# Patient Record
Sex: FEMALE | Race: WHITE | URBAN - METROPOLITAN AREA
[De-identification: names, ages, dates, MRNs, and addresses within clinical notes are randomized per-mention and may not be internally consistent; named-entity substitution may affect disease eponyms.]

---

## 2021-12-07 ENCOUNTER — *OTHER (OUTPATIENT)
Dept: URBAN - METROPOLITAN AREA CLINIC 26 | Facility: CLINIC | Age: 46
Setting detail: DERMATOLOGY
End: 2021-12-07

## 2021-12-07 DIAGNOSIS — D23.39 OTHER BENIGN NEOPLASM OF SKIN OF OTHER PARTS OF FACE: ICD-10-CM

## 2021-12-07 DIAGNOSIS — D18.01 HEMANGIOMA OF SKIN AND SUBCUTANEOUS TISSUE: ICD-10-CM

## 2021-12-07 DIAGNOSIS — L90.5 SCAR CONDITIONS AND FIBROSIS OF SKIN: ICD-10-CM

## 2021-12-07 DIAGNOSIS — Z85.828 PERSONAL HISTORY OF OTHER MALIGNANT NEOPLASM OF SKIN: ICD-10-CM

## 2021-12-07 DIAGNOSIS — D23.5 OTHER BENIGN NEOPLASM OF SKIN OF TRUNK: ICD-10-CM

## 2021-12-07 PROCEDURE — 99203 OFFICE O/P NEW LOW 30 MIN: CPT

## 2022-12-06 ENCOUNTER — APPOINTMENT (OUTPATIENT)
Dept: URBAN - METROPOLITAN AREA CLINIC 198 | Age: 47
Setting detail: DERMATOLOGY
End: 2022-12-06

## 2022-12-06 DIAGNOSIS — L72.0 EPIDERMAL CYST: ICD-10-CM

## 2022-12-06 DIAGNOSIS — L82.1 OTHER SEBORRHEIC KERATOSIS: ICD-10-CM

## 2022-12-06 DIAGNOSIS — L81.4 OTHER MELANIN HYPERPIGMENTATION: ICD-10-CM

## 2022-12-06 DIAGNOSIS — Z11.52 ENCOUNTER FOR SCREENING FOR COVID-19: ICD-10-CM

## 2022-12-06 DIAGNOSIS — L81.1 CHLOASMA: ICD-10-CM

## 2022-12-06 DIAGNOSIS — D22 MELANOCYTIC NEVI: ICD-10-CM

## 2022-12-06 PROBLEM — D22.62 MELANOCYTIC NEVI OF LEFT UPPER LIMB, INCLUDING SHOULDER: Status: ACTIVE | Noted: 2022-12-06

## 2022-12-06 PROBLEM — D22.61 MELANOCYTIC NEVI OF RIGHT UPPER LIMB, INCLUDING SHOULDER: Status: ACTIVE | Noted: 2022-12-06

## 2022-12-06 PROBLEM — D22.72 MELANOCYTIC NEVI OF LEFT LOWER LIMB, INCLUDING HIP: Status: ACTIVE | Noted: 2022-12-06

## 2022-12-06 PROBLEM — D23.39 OTHER BENIGN NEOPLASM OF SKIN OF OTHER PARTS OF FACE: Status: ACTIVE | Noted: 2022-12-06

## 2022-12-06 PROBLEM — D22.5 MELANOCYTIC NEVI OF TRUNK: Status: ACTIVE | Noted: 2022-12-06

## 2022-12-06 PROBLEM — D22.71 MELANOCYTIC NEVI OF RIGHT LOWER LIMB, INCLUDING HIP: Status: ACTIVE | Noted: 2022-12-06

## 2022-12-06 PROCEDURE — OTHER PRESCRIPTION: OTHER

## 2022-12-06 PROCEDURE — OTHER COUNSELING: OTHER

## 2022-12-06 PROCEDURE — 99213 OFFICE O/P EST LOW 20 MIN: CPT

## 2022-12-06 PROCEDURE — OTHER PRESCRIPTION MEDICATION MANAGEMENT: OTHER

## 2022-12-06 PROCEDURE — OTHER MIPS QUALITY: OTHER

## 2022-12-06 PROCEDURE — OTHER SCREENING FOR COVID-19: OTHER

## 2022-12-06 RX ORDER — FLUOCINOLONE ACETONIDE, HYDROQUINONE, AND TRETINOIN .1; 40; .5 MG/G; MG/G; MG/G
CREAM TOPICAL QHS
Qty: 30 | Refills: 3 | Status: ERX | COMMUNITY
Start: 2022-12-06

## 2022-12-06 ASSESSMENT — LOCATION ZONE DERM
LOCATION ZONE: EAR
LOCATION ZONE: FACE
LOCATION ZONE: LEG
LOCATION ZONE: TRUNK
LOCATION ZONE: ARM

## 2022-12-06 ASSESSMENT — LOCATION DETAILED DESCRIPTION DERM
LOCATION DETAILED: LEFT DISTAL POSTERIOR THIGH
LOCATION DETAILED: EPIGASTRIC SKIN
LOCATION DETAILED: INFERIOR MID FOREHEAD
LOCATION DETAILED: RIGHT ELBOW
LOCATION DETAILED: LEFT PROXIMAL DORSAL FOREARM
LOCATION DETAILED: LEFT CAVUM CONCHA
LOCATION DETAILED: INFERIOR THORACIC SPINE
LOCATION DETAILED: LEFT CENTRAL MALAR CHEEK
LOCATION DETAILED: RIGHT DISTAL POSTERIOR THIGH
LOCATION DETAILED: LEFT EXTERNAL AUDITORY CANAL
LOCATION DETAILED: RIGHT RIB CAGE

## 2022-12-06 ASSESSMENT — LOCATION SIMPLE DESCRIPTION DERM
LOCATION SIMPLE: INFERIOR FOREHEAD
LOCATION SIMPLE: LEFT POSTERIOR THIGH
LOCATION SIMPLE: LEFT EAR
LOCATION SIMPLE: RIGHT ELBOW
LOCATION SIMPLE: LEFT CHEEK
LOCATION SIMPLE: RIGHT POSTERIOR THIGH
LOCATION SIMPLE: ABDOMEN
LOCATION SIMPLE: UPPER BACK
LOCATION SIMPLE: LEFT FOREARM

## 2022-12-06 NOTE — PROCEDURE: PRESCRIPTION MEDICATION MANAGEMENT
Initiate Treatment: Tri-Sophy QHS x3 months
Detail Level: Detailed
Render In Strict Bullet Format?: No

## 2023-11-10 ENCOUNTER — OFFICE VISIT (OUTPATIENT)
Dept: NEUROLOGY | Facility: CLINIC | Age: 48
End: 2023-11-10
Payer: COMMERCIAL

## 2023-11-10 VITALS
WEIGHT: 138 LBS | OXYGEN SATURATION: 98 % | HEART RATE: 71 BPM | RESPIRATION RATE: 18 BRPM | HEIGHT: 63 IN | BODY MASS INDEX: 24.45 KG/M2 | DIASTOLIC BLOOD PRESSURE: 82 MMHG | SYSTOLIC BLOOD PRESSURE: 118 MMHG

## 2023-11-10 DIAGNOSIS — G43.009 MIGRAINE WITHOUT AURA AND WITHOUT STATUS MIGRAINOSUS, NOT INTRACTABLE: Primary | ICD-10-CM

## 2023-11-10 PROCEDURE — 3008F BODY MASS INDEX DOCD: CPT | Performed by: STUDENT IN AN ORGANIZED HEALTH CARE EDUCATION/TRAINING PROGRAM

## 2023-11-10 PROCEDURE — 99205 OFFICE O/P NEW HI 60 MIN: CPT | Performed by: STUDENT IN AN ORGANIZED HEALTH CARE EDUCATION/TRAINING PROGRAM

## 2023-11-10 RX ORDER — ALBUTEROL SULFATE 90 UG/1
INHALANT RESPIRATORY (INHALATION)
COMMUNITY

## 2023-11-10 RX ORDER — LEFLUNOMIDE 20 MG/1
1 TABLET ORAL DAILY
COMMUNITY
End: 2024-02-14

## 2023-11-10 RX ORDER — SODIUM, POTASSIUM,MAG SULFATES 17.5-3.13G
SOLUTION, RECONSTITUTED, ORAL ORAL
COMMUNITY
Start: 2023-10-09 | End: 2024-02-14

## 2023-11-10 RX ORDER — KETOCONAZOLE 20 MG/G
CREAM TOPICAL
COMMUNITY
Start: 2023-09-06 | End: 2024-09-12

## 2023-11-10 RX ORDER — HYDROXYCHLOROQUINE SULFATE 200 MG/1
200 TABLET, FILM COATED ORAL 2 TIMES DAILY
COMMUNITY

## 2023-11-10 RX ORDER — ELETRIPTAN HYDROBROMIDE 40 MG/1
TABLET, FILM COATED ORAL
COMMUNITY
Start: 2023-08-10 | End: 2023-11-10

## 2023-11-10 RX ORDER — ELETRIPTAN HYDROBROMIDE 40 MG/1
TABLET, FILM COATED ORAL
COMMUNITY
Start: 2023-06-16 | End: 2023-11-10

## 2023-11-10 RX ORDER — CHOLECALCIFEROL (VITAMIN D3) 25 MCG
1000 TABLET ORAL DAILY
COMMUNITY

## 2023-11-10 RX ORDER — OMEPRAZOLE 40 MG/1
CAPSULE, DELAYED RELEASE ORAL
COMMUNITY
Start: 2023-08-16 | End: 2024-02-14

## 2023-11-10 RX ORDER — ETANERCEPT 25 MG
KIT SUBCUTANEOUS
COMMUNITY
End: 2024-02-14

## 2023-11-10 RX ORDER — ATOGEPANT 60 MG/1
60 TABLET ORAL DAILY
Qty: 90 TABLET | Refills: 3 | Status: SHIPPED | OUTPATIENT
Start: 2023-11-10 | End: 2023-12-18 | Stop reason: SDUPTHER

## 2023-11-10 RX ORDER — PRAMIPEXOLE DIHYDROCHLORIDE 0.25 MG/1
TABLET ORAL
COMMUNITY
Start: 2023-09-15 | End: 2023-11-10

## 2023-11-10 RX ORDER — ASCORBIC ACID 250 MG
250 TABLET ORAL DAILY
COMMUNITY

## 2023-11-10 RX ORDER — GABAPENTIN ENACARBIL 300 MG/1
900 TABLET, EXTENDED RELEASE ORAL NIGHTLY
Qty: 270 TABLET | Refills: 1 | Status: SHIPPED | OUTPATIENT
Start: 2023-11-10 | End: 2024-10-25 | Stop reason: SDUPTHER

## 2023-11-10 RX ORDER — PRAMIPEXOLE DIHYDROCHLORIDE 0.12 MG/1
0.12 TABLET ORAL NIGHTLY
COMMUNITY
End: 2025-03-04 | Stop reason: SDUPTHER

## 2023-11-10 RX ORDER — GABAPENTIN ENACARBIL 600 MG/1
TABLET, EXTENDED RELEASE ORAL DAILY
COMMUNITY
End: 2023-11-10

## 2023-11-10 RX ORDER — GABAPENTIN ENACARBIL 600 MG/1
600 TABLET, EXTENDED RELEASE ORAL DAILY
COMMUNITY
Start: 2023-08-30 | End: 2023-11-10

## 2023-11-10 RX ORDER — ETANERCEPT 50 MG/ML
SOLUTION SUBCUTANEOUS
COMMUNITY
Start: 2023-08-23 | End: 2024-02-14

## 2023-11-10 RX ORDER — LEFLUNOMIDE 20 MG/1
20 TABLET ORAL DAILY
COMMUNITY
Start: 2023-08-24

## 2023-11-10 RX ORDER — GABAPENTIN ENACARBIL 600 MG/1
TABLET, EXTENDED RELEASE ORAL
COMMUNITY
Start: 2023-08-30 | End: 2023-11-10

## 2023-11-10 RX ORDER — GALCANEZUMAB 120 MG/ML
INJECTION, SOLUTION SUBCUTANEOUS
COMMUNITY
Start: 2023-08-10 | End: 2023-11-10

## 2023-11-10 RX ORDER — ONDANSETRON 4 MG/1
TABLET, FILM COATED ORAL
COMMUNITY
Start: 2023-09-06 | End: 2024-02-14
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
## 2023-11-10 NOTE — PATIENT INSTRUCTIONS
For better headache control: regular meals, sleep and exercise, pace activities, meditation to reduce stress  Headache diary to identify triggers/patterns: www.migrainebuddy.com  At on the onset of headache, take Motrin (Ibuprofen) and Ubrelvy as needed. Stop elatriptan   You can take a second dose of Ubrelvy in 2 hours if ongoing headache.  Max 2 doses in 24 hours  Avoid using Motrin more than an average of 2 days per week (10 days per month) to prevent medication overuse headache.  Preventive medication (daily headache medication) try Qulipta 60mg daily - take when your next dose of emgality would be due.  Stop Emgality.  In 3 weeks, if no issue with Qulipta you can increase gabapentin ER (Horizant) 900mg daily  See pain specialist for trigger point injections  Send me a copy of your brain MRI report  Send me the records from your ophthalmologist (eye doctor)  Follow-up visit in 3 months

## 2023-11-13 ENCOUNTER — OFFICE VISIT (OUTPATIENT)
Dept: PAIN MEDICINE | Facility: CLINIC | Age: 48
End: 2023-11-13
Payer: COMMERCIAL

## 2023-11-13 VITALS
SYSTOLIC BLOOD PRESSURE: 116 MMHG | DIASTOLIC BLOOD PRESSURE: 78 MMHG | OXYGEN SATURATION: 99 % | WEIGHT: 138 LBS | BODY MASS INDEX: 24.45 KG/M2 | HEIGHT: 63 IN | HEART RATE: 72 BPM

## 2023-11-13 DIAGNOSIS — M54.81 BILATERAL OCCIPITAL NEURALGIA: ICD-10-CM

## 2023-11-13 DIAGNOSIS — M54.2 CERVICALGIA: Primary | ICD-10-CM

## 2023-11-13 DIAGNOSIS — M79.18 MYOFASCIAL PAIN SYNDROME: ICD-10-CM

## 2023-11-13 PROCEDURE — 20553 NJX 1/MLT TRIGGER POINTS 3/>: CPT | Performed by: STUDENT IN AN ORGANIZED HEALTH CARE EDUCATION/TRAINING PROGRAM

## 2023-11-13 PROCEDURE — 64450 NJX AA&/STRD OTHER PN/BRANCH: CPT | Mod: 50,XU | Performed by: STUDENT IN AN ORGANIZED HEALTH CARE EDUCATION/TRAINING PROGRAM

## 2023-11-13 PROCEDURE — 99204 OFFICE O/P NEW MOD 45 MIN: CPT | Mod: 25 | Performed by: STUDENT IN AN ORGANIZED HEALTH CARE EDUCATION/TRAINING PROGRAM

## 2023-11-13 PROCEDURE — 64405 NJX AA&/STRD GR OCPL NRV: CPT | Mod: 50,XU | Performed by: STUDENT IN AN ORGANIZED HEALTH CARE EDUCATION/TRAINING PROGRAM

## 2023-11-13 PROCEDURE — 3008F BODY MASS INDEX DOCD: CPT | Performed by: STUDENT IN AN ORGANIZED HEALTH CARE EDUCATION/TRAINING PROGRAM

## 2023-11-13 RX ORDER — LIDOCAINE HYDROCHLORIDE 20 MG/ML
10 INJECTION, SOLUTION INFILTRATION; PERINEURAL ONCE
Status: SHIPPED | OUTPATIENT
Start: 2023-11-13 | End: 2023-11-14

## 2023-11-13 RX ORDER — BUPIVACAINE HYDROCHLORIDE 2.5 MG/ML
9 INJECTION, SOLUTION EPIDURAL; INFILTRATION; INTRACAUDAL ONCE
Status: DISCONTINUED | OUTPATIENT
Start: 2023-11-13 | End: 2023-11-13

## 2023-11-13 ASSESSMENT — PAIN SCALES - GENERAL: PAINLEVEL: 2

## 2023-11-13 NOTE — PATIENT INSTRUCTIONS
Cervical Facet Joint/Medial Branch Block and Radiofrequency Ablation (RFA)  XR guided imaging utilized  Two diagnostic blocks reported ~80% pain relief and improved function followed by RFA  Goal  Diagnostic and therapeutic value  6 months of relief (ranges from minimal to 6 months to 1 year)   Can be repeated every 6 months. Typically, it returns gradually and less than original pain.  Diagnostic Injections  Treatment is only local anesthetic (numbing medication), no steroids used   Under local anesthesia or mild-moderate sedation  Short duration relief is considered successful - duration of local anesthesia  If successful two times, we can consider proceeding with RFA  RFA  Deep sedation  Treatment is the thermal ablation (heat)  .
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
## 2023-11-21 ENCOUNTER — DOCUMENTATION (OUTPATIENT)
Dept: NEUROLOGY | Facility: CLINIC | Age: 48
End: 2023-11-21
Payer: COMMERCIAL

## 2023-11-21 NOTE — PROGRESS NOTES
Clinical staff has attempted to complete this multiple times with many kick backs with ins communication issues.     Submitted both PA's today via fax form with attached clinicals (exp review)

## 2023-11-22 NOTE — PROGRESS NOTES
Ubrelvy- approved   Qulipta- approved for qty 30 for 30 days so script will need to be updated for future fills, pharmacist able to change for current fill

## 2023-12-12 ENCOUNTER — APPOINTMENT (OUTPATIENT)
Dept: URBAN - METROPOLITAN AREA CLINIC 198 | Age: 48
Setting detail: DERMATOLOGY
End: 2023-12-12

## 2023-12-12 DIAGNOSIS — L50.1 IDIOPATHIC URTICARIA: ICD-10-CM

## 2023-12-12 DIAGNOSIS — L82.1 OTHER SEBORRHEIC KERATOSIS: ICD-10-CM

## 2023-12-12 DIAGNOSIS — L738 OTHER SPECIFIED DISEASES OF HAIR AND HAIR FOLLICLES: ICD-10-CM

## 2023-12-12 DIAGNOSIS — L663 OTHER SPECIFIED DISEASES OF HAIR AND HAIR FOLLICLES: ICD-10-CM

## 2023-12-12 DIAGNOSIS — L81.4 OTHER MELANIN HYPERPIGMENTATION: ICD-10-CM

## 2023-12-12 DIAGNOSIS — L57.8 OTHER SKIN CHANGES DUE TO CHRONIC EXPOSURE TO NONIONIZING RADIATION: ICD-10-CM

## 2023-12-12 DIAGNOSIS — D22 MELANOCYTIC NEVI: ICD-10-CM

## 2023-12-12 DIAGNOSIS — L50.3 DERMATOGRAPHIC URTICARIA: ICD-10-CM

## 2023-12-12 PROBLEM — D22.5 MELANOCYTIC NEVI OF TRUNK: Status: ACTIVE | Noted: 2023-12-12

## 2023-12-12 PROBLEM — D22.61 MELANOCYTIC NEVI OF RIGHT UPPER LIMB, INCLUDING SHOULDER: Status: ACTIVE | Noted: 2023-12-12

## 2023-12-12 PROBLEM — D22.72 MELANOCYTIC NEVI OF LEFT LOWER LIMB, INCLUDING HIP: Status: ACTIVE | Noted: 2023-12-12

## 2023-12-12 PROBLEM — D22.62 MELANOCYTIC NEVI OF LEFT UPPER LIMB, INCLUDING SHOULDER: Status: ACTIVE | Noted: 2023-12-12

## 2023-12-12 PROBLEM — D22.71 MELANOCYTIC NEVI OF RIGHT LOWER LIMB, INCLUDING HIP: Status: ACTIVE | Noted: 2023-12-12

## 2023-12-12 PROBLEM — L02.32 FURUNCLE OF BUTTOCK: Status: ACTIVE | Noted: 2023-12-12

## 2023-12-12 PROCEDURE — OTHER TREATMENT REGIMEN: OTHER

## 2023-12-12 PROCEDURE — OTHER COUNSELING: OTHER

## 2023-12-12 PROCEDURE — OTHER SUNSCREEN RECOMMENDATIONS: OTHER

## 2023-12-12 PROCEDURE — OTHER PRESCRIPTION MEDICATION MANAGEMENT: OTHER

## 2023-12-12 PROCEDURE — 99213 OFFICE O/P EST LOW 20 MIN: CPT

## 2023-12-12 ASSESSMENT — LOCATION DETAILED DESCRIPTION DERM
LOCATION DETAILED: RIGHT RIB CAGE
LOCATION DETAILED: INFERIOR THORACIC SPINE
LOCATION DETAILED: LEFT DISTAL POSTERIOR THIGH
LOCATION DETAILED: LEFT CENTRAL MALAR CHEEK
LOCATION DETAILED: RIGHT BUTTOCK
LOCATION DETAILED: LEFT PROXIMAL DORSAL FOREARM
LOCATION DETAILED: RIGHT DISTAL POSTERIOR THIGH
LOCATION DETAILED: LEFT VENTRAL PROXIMAL FOREARM
LOCATION DETAILED: RIGHT ELBOW
LOCATION DETAILED: LEFT BUTTOCK
LOCATION DETAILED: LEFT PROXIMAL PRETIBIAL REGION

## 2023-12-12 ASSESSMENT — LOCATION SIMPLE DESCRIPTION DERM
LOCATION SIMPLE: LEFT FOREARM
LOCATION SIMPLE: UPPER BACK
LOCATION SIMPLE: ABDOMEN
LOCATION SIMPLE: LEFT BUTTOCK
LOCATION SIMPLE: LEFT CHEEK
LOCATION SIMPLE: RIGHT ELBOW
LOCATION SIMPLE: RIGHT BUTTOCK
LOCATION SIMPLE: RIGHT POSTERIOR THIGH
LOCATION SIMPLE: LEFT POSTERIOR THIGH
LOCATION SIMPLE: LEFT PRETIBIAL REGION

## 2023-12-12 ASSESSMENT — LOCATION ZONE DERM
LOCATION ZONE: LEG
LOCATION ZONE: TRUNK
LOCATION ZONE: ARM
LOCATION ZONE: FACE

## 2023-12-18 RX ORDER — ATOGEPANT 60 MG/1
60 TABLET ORAL DAILY
Qty: 30 TABLET | Refills: 3 | Status: SHIPPED | OUTPATIENT
Start: 2023-12-18 | End: 2024-01-08 | Stop reason: SDUPTHER

## 2024-01-03 ENCOUNTER — TELEPHONE (OUTPATIENT)
Dept: NEUROLOGY | Facility: CLINIC | Age: 49
End: 2024-01-03
Payer: COMMERCIAL

## 2024-01-03 NOTE — TELEPHONE ENCOUNTER
Pt called today about Qulipta     Have been having multiple insurance issues with coverage, I have been giving her samples. Pt is out of samples and lives far from the office. She's been without for 4 days and now has migraine coming on. Pt took ubrelvy which didn't seem super helpful. She said she will try second dose. The qulipta though has been very beneficial while on it.     Working with reps/insurance currently to see what we can do    Any recommendations in the mean time since she is without a preventative medication?

## 2024-01-08 RX ORDER — ATOGEPANT 60 MG/1
60 TABLET ORAL DAILY
Qty: 30 TABLET | Refills: 3 | Status: SHIPPED | OUTPATIENT
Start: 2024-01-08 | End: 2024-01-10

## 2024-01-09 NOTE — TELEPHONE ENCOUNTER
I spoke with the rep from Qulipta today. She suggested to prescribe her Topamax or a beta blocker because she needs 2 oral preventative meds to have insurance approve coverage for Qulipta. We could try to resubmit for Qulipta after a month. I did submit an appeal to insurance and havent heard back and it is impossible to reach them via phone. She also suggested a new script for Qulipta to be sent to another pharmacy if all else fails where she could get 2 free courtesy fills for Qulipta while this blows over. But either way she needs to be prescribed something else for now to even consider that. Sorry I know its confusing. Let me know what you think. But it is being denied because the only oral she tried was Nortriptyline. She needs to try one more oral.

## 2024-01-10 ENCOUNTER — TELEMEDICINE (OUTPATIENT)
Dept: NEUROLOGY | Facility: CLINIC | Age: 49
End: 2024-01-10
Payer: COMMERCIAL

## 2024-01-10 DIAGNOSIS — G43.009 MIGRAINE WITHOUT AURA AND WITHOUT STATUS MIGRAINOSUS, NOT INTRACTABLE: Primary | ICD-10-CM

## 2024-01-10 PROCEDURE — 99214 OFFICE O/P EST MOD 30 MIN: CPT | Mod: 95 | Performed by: STUDENT IN AN ORGANIZED HEALTH CARE EDUCATION/TRAINING PROGRAM

## 2024-01-10 RX ORDER — TOPIRAMATE 25 MG/1
TABLET ORAL
Qty: 120 TABLET | Refills: 0 | Status: SHIPPED | OUTPATIENT
Start: 2024-01-10 | End: 2024-02-14

## 2024-01-10 NOTE — PROGRESS NOTES
Verification of Patient Location:  The patient affirms they are currently located in the following state: [place]     Request for Consent:    Audio and Video Encounter   Carol, my name is Mallory Arizmendi MD.  Before we proceed, can you please verify your identification by telling me your full name and date of birth?  Can you tell me who is in the room with you?     You and I are about to have a telemedicine check-in or visit because you have requested it.  This is a live video-conference.  I am a real person, speaking to you in real time.  There is no one else with me on the video-conference. I am not recording this conversation and I am asking you not to record it.  This telemedicine visit will be billed to your health insurance or you, if you are self-insured.  You understand you will be responsible for any copayments or coinsurances that apply to your telemedicine visit.  Communication platform used for this encounter:  [employer] Video Visit (Epic Video Client)        Before starting our telemedicine visit, I am required to get your consent for this virtual check-in or visit by telemedicine. Do you consent?        Patient Response to Request for Consent:  Yes        Visit Documentation:        Patient:   Kayy Raphael   YOB: 1975  Date of Visit:   January 10, 2024      Chief Complaint:  Migraines     History of Present Illness:   Kayy Raphael is a 48 y.o. with a history of anxiety/depression, migraines, restless leg syndrome presenting for follow-up.    Previously followed with Dr. Berman    · Much milder headaches on Qulipta - so low level did not need to use Ubrelvy  · She's been off Qulipta for 10 days and has recurrence of migraines and tinntius  · Completed trigger point injections not sure if helped - felt Qulipta may have helped migraines most    Occupation: tutoring, disaster relief  Contraception: becoming irregular     Preventive Headache Medications  Current:  topamax  Prior: nortriptyline - weight gain, Emgality, qulipta    Zonisamide - told she had partial seizures was only reporting migraine and muscle spasms, Dr. Berman did a longer study that was normal and stopped ZNS    Abortive Headache Medications Tried  Current: Ubrelvy  Prior: nurtec and ubrelvy - not effective, excedrin - may help, eletriptan - not resolving headaches    Not tried aleve or motrin      Past Medical History:    she  has a past medical history of Dysautonomia (CMS/HCC), IBS (irritable bowel syndrome), Migraine, Restless leg syndrome, and Rheumatoid arthritis (CMS/HCC).     Allergies:  Cat dander, Hydrocodone-acetaminophen, Methylprednisolone, Opioids - morphine analogues, and Zonisamide     All Medications:    Current Outpatient Medications:   •  albuterol HFA (PROAIR HFA) 90 mcg/actuation inhaler, inhale 2 puff by inhalation route  every 4 - 6 hours as needed, Disp: , Rfl:   •  ascorbic acid (VITAMIN C) 250 mg tablet, Take 250 mg by mouth daily., Disp: , Rfl:   •  atogepant (QULIPTA) 60 mg tablet, Take 60 mg by mouth daily., Disp: 30 tablet, Rfl: 3  •  Bacillus subtilis-inulin 1.5 billion cell-1 gram tablet,chewable, Take by mouth., Disp: , Rfl:   •  cholecalciferol, vitamin D3, 1,000 unit (25 mcg) tablet, Take 1,000 Units by mouth daily., Disp: , Rfl:   •  EnbreL SureClick 50 mg/mL (1 mL) pen injector, , Disp: , Rfl:   •  etanercept (EnbreL) 25 mg (1 mL) injection, inject 1 milliliter by subcutaneous route 2 times every week 72-96 hours apart, Disp: , Rfl:   •  gabapentin enacarbil (HORIZANT) 300 mg tablet extended release, Take 900 mg by mouth nightly., Disp: 270 tablet, Rfl: 1  •  hydroxychloroquine (PlaqueniL) 200 mg tablet, Take 1 tablet by mouth daily., Disp: , Rfl:   •  ketoconazole (NIZORAL) 2 % cream, APPLY TOPICALLY TO THE AFFECTED AREA TWICE DAILY, Disp: , Rfl:   •  leflunomide (ARAVA) 20 mg tablet, , Disp: , Rfl:   •  leflunomide (ARAVA) 20 mg tablet, Take 1 tablet by mouth daily.,  Disp: , Rfl:   •  omeprazole (PriLOSEC) 40 mg capsule, , Disp: , Rfl:   •  ondansetron (ZOFRAN) 4 mg tablet, TAKE 1 TABLET BY MOUTH THREE TIMES DAILY AS NEEDED FOR NAUSEA, Disp: , Rfl:   •  pramipexole (MIRAPEX) 0.125 mg tablet, Take 0.125 mg by mouth nightly., Disp: , Rfl:   •  sodium,potassium,mag sulfates solution, MIX AND DRINK AS DIRECTED, Disp: , Rfl:   •  ubrogepant (UBRELVY) 100 mg tablet tablet, Take 1 tablet (100 mg total) by mouth as needed for migraine. Second dose may be taken at least 2 hours after initial dose.  Max 2 doses in 24 hours, Disp: 16 tablet, Rfl: 1     Review of Systems: Complete review of systems was significant only for those items mentioned above. Otherwise, a review of systems was negative.    Physical Examination  Virtual visit - no exam    Prior Testing:  MRI brain 10/8/19 mild cerebral volume loss    Labwork:         Assessment and Plan  Kayy Raphael is a 48 y.o. with a history of anxiety/depression, migraines, restless leg syndrome presenting for follow-up.  Headaches improved with Qulipta but denied by insurance.  We will start topamax for prevention.  • For better headache control: regular meals, sleep and exercise, pace activities, meditation to reduce stress  • Headache diary to identify triggers/patterns: www.migrainebuddy.com  • At on the onset of headache, take Motrin (Ibuprofen) and Ubrelvy as needed. Stop elatriptan   o You can take a second dose of Ubrelvy in 2 hours if ongoing headache.  Max 2 doses in 24 hours  o Avoid using Motrin more than an average of 2 days per week (10 days per month) to prevent medication overuse headache.  • Preventive medication (daily headache medication) Topamax  Start topamax  Week 1: take 25mg at night for 1 week  Week 2: take 25mg twice a day for 1 week  Week 3: take 25mg in morning and 50mg at night for 1 week  Week 4: take 50mg twice a day.  Stay on this dose  • Follow-up visit in February      Discussed medication side effects and  risks/benefits: yes   Discussed potential teratogenic/neurodevelopmental risk with medications: yes    For billing purposes, I spent 30 total minutes including pre-visit, face-to-face, and post-visit documentation on the visit date.  I discussed the assessment and plan with the patient/family, and they were given the opportunity to ask questions.  They expressed understanding and agreement with the plan.      Mallory Arizmendi MD

## 2024-01-19 RX ORDER — ATOGEPANT 60 MG/1
60 TABLET ORAL DAILY
Qty: 30 TABLET | Refills: 5 | Status: SHIPPED | OUTPATIENT
Start: 2024-01-19 | End: 2024-01-30

## 2024-01-24 ENCOUNTER — DOCUMENTATION (OUTPATIENT)
Dept: NEUROLOGY | Facility: CLINIC | Age: 49
End: 2024-01-24
Payer: COMMERCIAL

## 2024-01-24 NOTE — PROGRESS NOTES
Patient tried and failed Topamax which was needed in order to resubmit authorization for Qulipta which was previously denied.    Patient was able to fill an rx for Qulipta yesterday with a copay card for a months supply. She will restart this medication.    PA was resubmitted today for Qulipta via fax to [employer]     Patient is aware of all information in regards to this case.

## 2024-01-30 ENCOUNTER — TELEPHONE (OUTPATIENT)
Dept: NEUROLOGY | Facility: CLINIC | Age: 49
End: 2024-01-30
Payer: COMMERCIAL

## 2024-01-30 RX ORDER — ATOGEPANT 60 MG/1
60 TABLET ORAL DAILY
Qty: 30 TABLET | Refills: 5 | Status: SHIPPED | OUTPATIENT
Start: 2024-01-30 | End: 2024-02-14

## 2024-01-30 NOTE — TELEPHONE ENCOUNTER
----- Message from Angela Bridges MA sent at 1/30/2024 11:32 AM EST -----  Regarding: Topamax  Contact: [phone]  Hi Dr Arizmendi, was finally able to get her Qulipta approved. Is there any way we could discontinue it at the [facility] and send it back to [facility] for the future?       ----- Message -----  From: Kayy Raphael  Sent: 1/30/2024  11:22 AM EST  To: [facility] Clin Support Pool  Subject: Topamax                                          That is great news! Thanks so much! I would like to get it at the original pharmacy, [facility], if possible. Thanks again!

## 2024-02-14 ENCOUNTER — OFFICE VISIT (OUTPATIENT)
Dept: NEUROLOGY | Facility: CLINIC | Age: 49
End: 2024-02-14
Payer: COMMERCIAL

## 2024-02-14 ENCOUNTER — OFFICE VISIT (OUTPATIENT)
Dept: PAIN MEDICINE | Facility: CLINIC | Age: 49
End: 2024-02-14
Payer: COMMERCIAL

## 2024-02-14 VITALS
OXYGEN SATURATION: 99 % | BODY MASS INDEX: 24.8 KG/M2 | SYSTOLIC BLOOD PRESSURE: 124 MMHG | HEIGHT: 63 IN | WEIGHT: 140 LBS | HEART RATE: 73 BPM | DIASTOLIC BLOOD PRESSURE: 80 MMHG

## 2024-02-14 VITALS
HEIGHT: 63 IN | WEIGHT: 140 LBS | HEART RATE: 73 BPM | OXYGEN SATURATION: 99 % | DIASTOLIC BLOOD PRESSURE: 80 MMHG | BODY MASS INDEX: 24.8 KG/M2 | SYSTOLIC BLOOD PRESSURE: 124 MMHG

## 2024-02-14 DIAGNOSIS — G43.009 MIGRAINE WITHOUT AURA AND WITHOUT STATUS MIGRAINOSUS, NOT INTRACTABLE: Primary | ICD-10-CM

## 2024-02-14 DIAGNOSIS — M79.18 MYOFASCIAL PAIN SYNDROME: ICD-10-CM

## 2024-02-14 DIAGNOSIS — M54.2 CERVICALGIA: Primary | ICD-10-CM

## 2024-02-14 DIAGNOSIS — M54.81 BILATERAL OCCIPITAL NEURALGIA: ICD-10-CM

## 2024-02-14 PROCEDURE — 3008F BODY MASS INDEX DOCD: CPT | Performed by: STUDENT IN AN ORGANIZED HEALTH CARE EDUCATION/TRAINING PROGRAM

## 2024-02-14 PROCEDURE — 99214 OFFICE O/P EST MOD 30 MIN: CPT | Mod: 25 | Performed by: STUDENT IN AN ORGANIZED HEALTH CARE EDUCATION/TRAINING PROGRAM

## 2024-02-14 PROCEDURE — 99214 OFFICE O/P EST MOD 30 MIN: CPT | Performed by: STUDENT IN AN ORGANIZED HEALTH CARE EDUCATION/TRAINING PROGRAM

## 2024-02-14 PROCEDURE — 20553 NJX 1/MLT TRIGGER POINTS 3/>: CPT | Performed by: STUDENT IN AN ORGANIZED HEALTH CARE EDUCATION/TRAINING PROGRAM

## 2024-02-14 RX ORDER — BUPIVACAINE HYDROCHLORIDE 2.5 MG/ML
5 INJECTION, SOLUTION INFILTRATION; PERINEURAL ONCE
Status: SHIPPED | OUTPATIENT
Start: 2024-02-14 | End: 2024-02-14

## 2024-02-14 RX ORDER — INFLIXIMAB-AXXQ 100 MG/10ML
INJECTION, POWDER, LYOPHILIZED, FOR SOLUTION INTRAVENOUS
COMMUNITY
Start: 2024-02-07

## 2024-02-14 RX ORDER — CELECOXIB 100 MG/1
100 CAPSULE ORAL 2 TIMES DAILY
COMMUNITY
End: 2024-09-12

## 2024-02-14 RX ORDER — RIMEGEPANT SULFATE 75 MG/75MG
75 TABLET, ORALLY DISINTEGRATING ORAL AS NEEDED
Qty: 10 TABLET | Refills: 1 | Status: SHIPPED | OUTPATIENT
Start: 2024-02-14 | End: 2024-05-02

## 2024-02-14 RX ORDER — FAMOTIDINE 40 MG/1
40 TABLET, FILM COATED ORAL DAILY
COMMUNITY
Start: 2024-02-13

## 2024-02-14 NOTE — PROCEDURES
Trigger point injections    Date/Time: 2/14/2024 10:25 AM    Performed by: London Lucero DO  Authorized by: London Lucero DO    Consent:     Consent obtained:  Verbal    Consent given by:  Patient    Risks discussed:  Bleeding, infection, nerve damage, pain and poor cosmetic result    Alternatives discussed:  Alternative treatment  Universal protocol:     Procedure explained and questions answered to patient or proxy's satisfaction: yes      Immediately prior to procedure, a time out was called: yes      Patient identity confirmed:  Verbally with patient  Indications:     Indications:  Myofascial Syndrome  Pre-procedure details:     Skin preparation:  ChloraPrep    Preparation: Patient was prepped and draped in the usual sterile fashion    Anesthesia:     Anesthesia method:  None  Procedure specific details:      Bilateral trigger points were identified based on anatomy/palpation.   This was located paracervical muscles, trapezius, rhomboids.    The area was cleared with chloraprep and dried.    After negative aspiration, 5 mL of bupivacaine 0.25% was easily injected with 30G one inch needle at each for a total of 10 sites.  Injected in equal parts at each site.  Patient tolerated procedure without complication.   Patient noted decreased pain.    Bupivacaine   NDC: 1070520005  LOT: [id number]  Exp Date: 8/1/2024      Post-procedure details:     Procedure completion:  Tolerated well, no immediate complications

## 2024-02-14 NOTE — PATIENT INSTRUCTIONS
For better headache control: regular meals, sleep and exercise, pace activities, meditation to reduce stress  Headache diary to identify triggers/patterns: [email or web address].com  At on the onset of headache, take Ubrelvy as needed. If Ubrelvy is still causing side effects and needing second dose.  We will start Nurtec  Nurtec take pill under the tongue and 1 dose in 24 hours  Preventive medication (daily headache medication) Qulipta 60mg at night  Follow-up visit in 4 months

## 2024-02-14 NOTE — PROGRESS NOTES
Patient:   Kayy Raphael   YOB: 1975  Date of Visit:   February 14, 2024      Chief Complaint:  Migraines     History of Present Illness:   Kayy Raphael is a 48 y.o. with a history of anxiety/depression, migraines, restless leg syndrome, rheumatoid arthritis presenting for follow-up.    Previously followed with Dr. Berman    · Topamax up to 25mg BID- sedated, dizzy, nausea, and abdominal pain  · Qulipta approved and significant improvement on this medication  · Daily migraines to 1 migraine per week  · Ubrelvy may work but needing second dose and making her nervous and nauseous    Occupation: tutoring, disaster relief  Contraception: becoming irregular     Preventive Headache Medications  Current: qulipta  Prior: nortriptyline - weight gain, Emgality, topamax - sedated, dizzy, nausea, and abdominal pain      Zonisamide - told she had partial seizures was only reporting migraine and muscle spasms, Dr. Berman did a longer study that was normal and stopped ZNS    Abortive Headache Medications Tried  Current: Ubrelvy  Prior: nurtec and ubrelvy - not effective, excedrin - may help, eletriptan - not resolving headaches    Not tried aleve or motrin      Past Medical History:    she  has a past medical history of Dysautonomia (CMS/HCC), IBS (irritable bowel syndrome), Migraine, Restless leg syndrome, and Rheumatoid arthritis (CMS/HCC).     Allergies:  Cat dander, Hydrocodone-acetaminophen, Methylprednisolone, Opioids - morphine analogues, and Zonisamide     All Medications:    Current Outpatient Medications:   •  albuterol HFA (PROAIR HFA) 90 mcg/actuation inhaler, inhale 2 puff by inhalation route  every 4 - 6 hours as needed, Disp: , Rfl:   •  ascorbic acid (VITAMIN C) 250 mg tablet, Take 250 mg by mouth daily., Disp: , Rfl:   •  atogepant (QULIPTA) 60 mg tablet, Take 60 mg by mouth daily., Disp: 30 tablet, Rfl: 5  •  Bacillus subtilis-inulin 1.5 billion cell-1 gram  tablet,chewable, Take by mouth., Disp: , Rfl:   •  cholecalciferol, vitamin D3, 1,000 unit (25 mcg) tablet, Take 1,000 Units by mouth daily., Disp: , Rfl:   •  famotidine (PEPCID) 40 mg tablet, Take 40 mg by mouth daily., Disp: , Rfl:   •  gabapentin enacarbil (HORIZANT) 300 mg tablet extended release, Take 900 mg by mouth nightly., Disp: 270 tablet, Rfl: 1  •  hydroxychloroquine (PlaqueniL) 200 mg tablet, Take 200 mg by mouth 2 (two) times a day., Disp: , Rfl:   •  inFLIXimab-axxq (AVSOLA) 100 mg recon soln injection, 3 mg/kg of body weightas directed intravenously monthly x 3 then every 8 weeks, Disp: , Rfl:   •  ketoconazole (NIZORAL) 2 % cream, APPLY TOPICALLY TO THE AFFECTED AREA TWICE DAILY, Disp: , Rfl:   •  leflunomide (ARAVA) 20 mg tablet, Take 20 mg by mouth daily., Disp: , Rfl:   •  pramipexole (MIRAPEX) 0.125 mg tablet, Take 0.125 mg by mouth nightly., Disp: , Rfl:   •  ubrogepant (UBRELVY) 100 mg tablet tablet, Take 1 tablet (100 mg total) by mouth as needed for migraine. Second dose may be taken at least 2 hours after initial dose.  Max 2 doses in 24 hours, Disp: 16 tablet, Rfl: 1  •  EnbreL SureClick 50 mg/mL (1 mL) pen injector, , Disp: , Rfl:   •  etanercept (EnbreL) 25 mg (1 mL) injection, inject 1 milliliter by subcutaneous route 2 times every week 72-96 hours apart, Disp: , Rfl:   •  leflunomide (ARAVA) 20 mg tablet, Take 1 tablet by mouth daily., Disp: , Rfl:   •  omeprazole (PriLOSEC) 40 mg capsule, , Disp: , Rfl:   •  ondansetron (ZOFRAN) 4 mg tablet, TAKE 1 TABLET BY MOUTH THREE TIMES DAILY AS NEEDED FOR NAUSEA, Disp: , Rfl:   •  sodium,potassium,mag sulfates solution, MIX AND DRINK AS DIRECTED, Disp: , Rfl:   •  topiramate (TOPAMAX) 25 mg tablet, Wk1: 25mg at night, Wk 2: 25mg twice a day, Wk 3: 25mg in the morning and 50mg at night, Wk 4: 50mg twice a day.  Stay on this dose. Prescription 1 of 2 please fill first (Patient not taking: Reported on 1/30/2024), Disp: 120 tablet, Rfl: 0  "    Review of Systems: Complete review of systems was significant only for those items mentioned above. Otherwise, a review of systems was negative.    Physical Examination  Constitutional:              Appearance: The patient is a healthy appearing female    Visit Vitals  /80 (BP Location: Left upper arm, Patient Position: Sitting)   Pulse 73   Ht 1.6 m (5' 3\")   Wt 63.5 kg (140 lb)   SpO2 99%   BMI 24.80 kg/m²        ENT: Normocephalic and atraumatic  Eyes:   ???Extraocular Movements: normal  Psychiatric: ???  ???Speech: normal, mood and affect: normal  ?  Neurologic Exam?     Mental Status:?  Oriented to person, place, and time.   Speech:?speech is normal?  Level of consciousness: alert  Knowledge:?good  Normal comprehension  ?  Cranial Nerves:?  I: Ophthalmoscopic/eye exam reveals a normal fundus with sharp optic disc margins bilaterally   II:  Visual fields are full to confrontation.  III, IV, VI:  Pupils are equal, round, reactive to light.  Extraocular eye movements intact  V:  Facial sensation is intact to touch symmetrically in all three divisions.  VII:  Face is symmetric  VIII:  Hearing is intact bilaterally to finger rub.  IX, X:  Palate is midline and elevates symmetric  XI:  Sternocleidomastoid and trapezius strength is normal.   XII:  Tongue is midline  MOTOR: Normal bulk and tone symmetrically.  5/5 strength in upper and lower extremities throughout.  No pronator drift.  No tremor.  COORDINATION: Finger-to-nose is normal bilaterally.    GAIT:  normal    Prior Testing:  MRI brain 10/8/19 mild cerebral volume loss    Labwork:         Assessment and Plan  Kayy Raphael is a 48 y.o. with a history of anxiety/depression, migraines, restless leg syndrome, rheumatoid arthritis presenting for follow-up.  Significant improvement of migraines with Qulipta.  Ubrelvy may help headaches but often needing second dose and some side effects.  Rheumatologist is adding Celebrex.  We discussed monitoring if " headaches improve or worsen and reviewed possibility of rebound headaches.  She will make this change and then if not improved will switch to Nurtec.  • For better headache control: regular meals, sleep and exercise, pace activities, meditation to reduce stress  • Headache diary to identify triggers/patterns: [email or web address].com  • At on the onset of headache, take Ubrelvy as needed. If Ubrelvy is still causing side effects and needing second dose.  We will start Nurtec  o Nurtec take pill under the tongue and 1 dose in 24 hours  • Preventive medication (daily headache medication) Qulipta 60mg at night  • Follow-up visit in 4 months      Discussed medication side effects and risks/benefits: yes   Discussed potential teratogenic/neurodevelopmental risk with medications: yes    For billing purposes, I spent 30 total minutes including pre-visit, face-to-face, and post-visit documentation on the visit date.  I discussed the assessment and plan with the patient/family, and they were given the opportunity to ask questions.  They expressed understanding and agreement with the plan.      Mallory Arizmendi MD
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
## 2024-02-16 RX ORDER — ATOGEPANT 60 MG/1
60 TABLET ORAL DAILY
COMMUNITY
End: 2024-12-06 | Stop reason: SDUPTHER

## 2024-02-19 ENCOUNTER — DOCUMENTATION (OUTPATIENT)
Dept: NEUROLOGY | Facility: CLINIC | Age: 49
End: 2024-02-19
Payer: COMMERCIAL

## 2024-05-02 RX ORDER — RIMEGEPANT SULFATE 75 MG/75MG
75 TABLET, ORALLY DISINTEGRATING ORAL AS NEEDED
Qty: 16 TABLET | Refills: 1 | Status: SHIPPED | OUTPATIENT
Start: 2024-05-02 | End: 2025-01-14

## 2024-05-02 NOTE — TELEPHONE ENCOUNTER
Medicine Refill Request    Last Office Visit: 2/14/2024   Last Telemedicine Visit: 1/10/2024 Mallory Arizmendi MD  Next Appointment: 6/19/2024

## 2024-05-13 ENCOUNTER — APPOINTMENT (OUTPATIENT)
Dept: URBAN - METROPOLITAN AREA CLINIC 198 | Age: 49
Setting detail: DERMATOLOGY
End: 2024-05-13

## 2024-05-13 DIAGNOSIS — D22 MELANOCYTIC NEVI: ICD-10-CM

## 2024-05-13 DIAGNOSIS — L81.1 CHLOASMA: ICD-10-CM

## 2024-05-13 PROBLEM — D22.62 MELANOCYTIC NEVI OF LEFT UPPER LIMB, INCLUDING SHOULDER: Status: ACTIVE | Noted: 2024-05-13

## 2024-05-13 PROBLEM — D23.62 OTHER BENIGN NEOPLASM OF SKIN OF LEFT UPPER LIMB, INCLUDING SHOULDER: Status: ACTIVE | Noted: 2024-05-13

## 2024-05-13 PROCEDURE — OTHER PRESCRIPTION MEDICATION MANAGEMENT: OTHER

## 2024-05-13 PROCEDURE — OTHER EXTRACTIONS: OTHER

## 2024-05-13 PROCEDURE — OTHER WOOD'S LAMP: OTHER

## 2024-05-13 PROCEDURE — OTHER COUNSELING: OTHER

## 2024-05-13 PROCEDURE — 99213 OFFICE O/P EST LOW 20 MIN: CPT

## 2024-05-13 ASSESSMENT — LOCATION ZONE DERM
LOCATION ZONE: FACE
LOCATION ZONE: ARM

## 2024-05-13 ASSESSMENT — LOCATION DETAILED DESCRIPTION DERM
LOCATION DETAILED: LEFT POSTERIOR SHOULDER
LOCATION DETAILED: SUPERIOR MID FOREHEAD

## 2024-05-13 ASSESSMENT — LOCATION SIMPLE DESCRIPTION DERM
LOCATION SIMPLE: SUPERIOR FOREHEAD
LOCATION SIMPLE: LEFT SHOULDER

## 2024-05-13 NOTE — PROCEDURE: PRESCRIPTION MEDICATION MANAGEMENT
Detail Level: Zone
Continue Regimen: TriLuma
Render In Strict Bullet Format?: No
Samples Given: Eucerin Mineral Tint

## 2024-05-13 NOTE — PROCEDURE: EXTRACTIONS
Render Post-Care Instructions In Note?: no
Detail Level: Detailed
Acne Type: A milial cyst
Prep Text (Optional): Prior to removal the treatment areas were prepped in the usual fashion.
Extraction Method: 18 gauge needle
Consent was obtained and risks were reviewed including but not limited to scarring, infection, bleeding, scabbing, incomplete removal, and allergy to anesthesia.
Post-Care Instructions: I reviewed with the patient in detail post-care instructions. Patient is to keep the treatment areas dry overnight, and then apply bacitracin twice daily until healed. Patient may apply hydrogen peroxide soaks to remove any crusting.

## 2024-08-05 ENCOUNTER — APPOINTMENT (OUTPATIENT)
Dept: URBAN - METROPOLITAN AREA CLINIC 198 | Age: 49
Setting detail: DERMATOLOGY
End: 2024-08-05

## 2024-08-05 DIAGNOSIS — D22 MELANOCYTIC NEVI: ICD-10-CM

## 2024-08-05 PROBLEM — D23.72 OTHER BENIGN NEOPLASM OF SKIN OF LEFT LOWER LIMB, INCLUDING HIP: Status: ACTIVE | Noted: 2024-08-05

## 2024-08-05 PROBLEM — D22.4 MELANOCYTIC NEVI OF SCALP AND NECK: Status: ACTIVE | Noted: 2024-08-05

## 2024-08-05 PROCEDURE — OTHER REASSURANCE: OTHER

## 2024-08-05 PROCEDURE — OTHER COUNSELING: OTHER

## 2024-08-05 PROCEDURE — 99212 OFFICE O/P EST SF 10 MIN: CPT

## 2024-08-05 ASSESSMENT — LOCATION DETAILED DESCRIPTION DERM: LOCATION DETAILED: LEFT SUPERIOR PARIETAL SCALP

## 2024-08-05 ASSESSMENT — LOCATION ZONE DERM: LOCATION ZONE: SCALP

## 2024-08-05 ASSESSMENT — LOCATION SIMPLE DESCRIPTION DERM: LOCATION SIMPLE: SCALP

## 2024-08-08 ENCOUNTER — DOCUMENTATION (OUTPATIENT)
Dept: NEUROLOGY | Facility: CLINIC | Age: 49
End: 2024-08-08
Payer: COMMERCIAL

## 2024-09-12 ENCOUNTER — DOCUMENTATION (OUTPATIENT)
Dept: NEUROLOGY | Facility: CLINIC | Age: 49
End: 2024-09-12

## 2024-09-12 ENCOUNTER — TELEMEDICINE (OUTPATIENT)
Dept: NEUROLOGY | Facility: CLINIC | Age: 49
End: 2024-09-12
Payer: COMMERCIAL

## 2024-09-12 DIAGNOSIS — M54.2 NECK PAIN: ICD-10-CM

## 2024-09-12 DIAGNOSIS — G43.009 MIGRAINE WITHOUT AURA AND WITHOUT STATUS MIGRAINOSUS, NOT INTRACTABLE: Primary | ICD-10-CM

## 2024-09-12 PROCEDURE — 99214 OFFICE O/P EST MOD 30 MIN: CPT | Mod: 95 | Performed by: STUDENT IN AN ORGANIZED HEALTH CARE EDUCATION/TRAINING PROGRAM

## 2024-09-12 RX ORDER — ZAVEGEPANT 10 MG/.1ML
1 SPRAY NASAL DAILY
Qty: 1 EACH | Refills: 1 | Status: SHIPPED | OUTPATIENT
Start: 2024-09-12 | End: 2025-01-14

## 2024-09-12 NOTE — PROGRESS NOTES
Verification of Patient Location:  The patient affirms they are currently located in the following state: [place]     Request for Consent:    Audio and Video Encounter   Carol, my name is Mallory Arizmendi MD.  Before we proceed, can you please verify your identification by telling me your full name and date of birth?  Can you tell me who is in the room with you?     You and I are about to have a telemedicine check-in or visit because you have requested it.  This is a live video-conference.  I am a real person, speaking to you in real time.  There is no one else with me on the video-conference. I am not recording this conversation and I am asking you not to record it.  This telemedicine visit will be billed to your health insurance or you, if you are self-insured.  You understand you will be responsible for any copayments or coinsurances that apply to your telemedicine visit.  Communication platform used for this encounter:  [employer] Video Visit (Epic Video Client)        Before starting our telemedicine visit, I am required to get your consent for this virtual check-in or visit by telemedicine. Do you consent?        Patient Response to Request for Consent:  Yes        Visit Documentation:    Patient:   Kayy Raphael   YOB: 1975  Date of Visit:   September 12, 2024      Chief Complaint:  Migraines     History of Present Illness:     Previously followed with Dr. Berman    Baseline headaches: daily  Headache frequency now: once per week, lasts 4-5 hours until falls asleep  Taking nurtec if combines with motrin bit better  In June studying for a class in July so daily headaches in June. Improved after class in July  Tried celebrex recommended by rheumatologist but seemed to cause rebound headache  Neck pain every day    Neuropsych testing - 8/16/23 post concussion syndrome  Eye testing: saccadic dysfunction    Occupation: tutoring, disaster relief  Contraception: becoming  irregular     Preventive Headache Medications  Current: qulipta  Prior: nortriptyline - weight gain, Emgality - worked well for 1-2 years then less effective, topamax - sedated, dizzy, nausea, and abdominal pain      Zonisamide - told she had partial seizures was only reporting migraine and muscle spasms, Dr. Berman did a longer study that was normal and stopped ZNS    Abortive Headache Medications Tried  Current: Nurtec  Prior: ubrelvy - nausea, excedrin - may help, eletriptan - not resolving headaches    Not tried aleve or motrin    Allergies:  Cat dander, Hydrocodone-acetaminophen, Methylprednisolone, Opioids - morphine analogues, and Zonisamide     All Medications:    Current Outpatient Medications:     albuterol HFA (PROAIR HFA) 90 mcg/actuation inhaler, inhale 2 puff by inhalation route  every 4 - 6 hours as needed, Disp: , Rfl:     ascorbic acid (VITAMIN C) 250 mg tablet, Take 250 mg by mouth daily., Disp: , Rfl:     atogepant (QULIPTA) 60 mg tablet, Take 60 mg by mouth daily., Disp: , Rfl:     Bacillus subtilis-inulin 1.5 billion cell-1 gram tablet,chewable, Take by mouth., Disp: , Rfl:     cholecalciferol, vitamin D3, 1,000 unit (25 mcg) tablet, Take 1,000 Units by mouth daily., Disp: , Rfl:     famotidine (PEPCID) 40 mg tablet, Take 40 mg by mouth daily., Disp: , Rfl:     gabapentin enacarbil (HORIZANT) 300 mg tablet extended release, Take 900 mg by mouth nightly., Disp: 270 tablet, Rfl: 1    hydroxychloroquine (PlaqueniL) 200 mg tablet, Take 200 mg by mouth 2 (two) times a day., Disp: , Rfl:     inFLIXimab-axxq (AVSOLA) 100 mg recon soln injection, 3 mg/kg of body weightas directed intravenously monthly x 3 then every 8 weeks, Disp: , Rfl:     leflunomide (ARAVA) 20 mg tablet, Take 20 mg by mouth daily., Disp: , Rfl:     pramipexole (MIRAPEX) 0.125 mg tablet, Take 0.125 mg by mouth nightly., Disp: , Rfl:     rimegepant (NURTEC ODT) 75 mg tablet,disintegrating, Take 1 tablet (75 mg total) by mouth as  needed (Migraine)., Disp: 16 tablet, Rfl: 1     Review of Systems: Complete review of systems was significant only for those items mentioned above. Otherwise, a review of systems was negative.    Physical Examination  Virtual visit    Prior Testing:  MRI brain 10/8/19 mild cerebral volume loss    Labwork:         Assessment and Plan  Kayy Raphael is a 49 y.o. with a history of anxiety/depression, migraines, restless leg syndrome, rheumatoid arthritis presenting for follow-up.  Overall, she is doing well except this summer when studying for her class.  Once she gets a headache, however, it lasts 4-5 hours, and she has to lie down.  We discussed add headache vitamins for prevention.  We will try Zavzpret spray as an abortive to see if we can get faster relief.    Headache diary to identify triggers/patterns: [email or web address].[employer]  At on the onset of headache, take Zavzpret spray.  Max 1 spray in 24 hours.  If you try this spray and it is not helpful you can go back to taking Nurtec and Motrin.    Preventive medication (daily headache medication) Qulipta 60mg at night  Vitamins take daily:  Coenzyme Q10 200mg daily  Magnesium glycinate 250mg twice a day - can start at a lower dose if GI issues  Vitamin B2 400mg daily  Physical therapist: Bernarda Blackwell  Address: [employer], Media: [address], [address]  Phone: [phone]   Follow-up visit in 4 months      Discussed medication side effects and risks/benefits: yes   Discussed potential teratogenic/neurodevelopmental risk with medications: yes    For billing purposes, I spent 30 total minutes including pre-visit, face-to-face, and post-visit documentation on the visit date.  I discussed the assessment and plan with the patient/family, and they were given the opportunity to ask questions.  They expressed understanding and agreement with the plan.      Mallory Arizmendi MD

## 2024-10-25 DIAGNOSIS — G25.81 RESTLESS LEG SYNDROME: Primary | ICD-10-CM

## 2024-10-25 RX ORDER — GABAPENTIN 600 MG/1
TABLET ORAL
Status: CANCELLED | OUTPATIENT
Start: 2024-10-25 | End: 2025-04-23

## 2024-10-25 RX ORDER — GABAPENTIN ENACARBIL 300 MG/1
900 TABLET, EXTENDED RELEASE ORAL NIGHTLY
Qty: 270 TABLET | Refills: 3 | Status: SHIPPED | OUTPATIENT
Start: 2024-10-25 | End: 2024-10-30

## 2024-10-29 DIAGNOSIS — G25.81 RESTLESS LEG SYNDROME: ICD-10-CM

## 2024-10-29 RX ORDER — GABAPENTIN ENACARBIL 300 MG/1
900 TABLET, EXTENDED RELEASE ORAL NIGHTLY
Qty: 270 TABLET | Refills: 3 | Status: CANCELLED | OUTPATIENT
Start: 2024-10-29

## 2024-10-29 NOTE — TELEPHONE ENCOUNTER
Called the pharmacy and Daniel who is the pharmacy technician informed me that for the gabapentin enacarbil (HORIZANT) 300 mg tablets extended release for 270 pills is $6,000. But for the regular gabapentin medication it should be around $200 but the co-pay will be much affordable.

## 2024-10-30 RX ORDER — GABAPENTIN 300 MG/1
900 CAPSULE ORAL NIGHTLY
Qty: 270 CAPSULE | Refills: 3 | Status: SHIPPED | OUTPATIENT
Start: 2024-10-30 | End: 2025-10-25

## 2024-10-30 NOTE — TELEPHONE ENCOUNTER
Called patient and informed her about the medication. She said that she is willing to try the regular gabapentin medication.

## 2024-11-21 ENCOUNTER — APPOINTMENT (OUTPATIENT)
Dept: URBAN - METROPOLITAN AREA CLINIC 198 | Age: 49
Setting detail: DERMATOLOGY
End: 2024-11-21

## 2024-11-21 DIAGNOSIS — L21.8 OTHER SEBORRHEIC DERMATITIS: ICD-10-CM

## 2024-11-21 PROCEDURE — 99214 OFFICE O/P EST MOD 30 MIN: CPT

## 2024-11-21 PROCEDURE — OTHER PRESCRIPTION: OTHER

## 2024-11-21 PROCEDURE — OTHER COUNSELING: OTHER

## 2024-11-21 RX ORDER — KETOCONAZOLE 20 MG/G
CREAM TOPICAL
Qty: 60 | Refills: 5 | Status: ERX | COMMUNITY
Start: 2024-11-21

## 2024-11-21 ASSESSMENT — LOCATION ZONE DERM: LOCATION ZONE: FACE

## 2024-11-21 ASSESSMENT — LOCATION SIMPLE DESCRIPTION DERM: LOCATION SIMPLE: SUPERIOR FOREHEAD

## 2024-11-21 ASSESSMENT — LOCATION DETAILED DESCRIPTION DERM: LOCATION DETAILED: SUPERIOR MID FOREHEAD

## 2024-12-06 DIAGNOSIS — G43.009 MIGRAINE WITHOUT AURA AND WITHOUT STATUS MIGRAINOSUS, NOT INTRACTABLE: Primary | ICD-10-CM

## 2024-12-06 RX ORDER — ATOGEPANT 60 MG/1
60 TABLET ORAL DAILY
Qty: 30 TABLET | Refills: 1 | Status: SHIPPED | OUTPATIENT
Start: 2024-12-06 | End: 2025-02-04

## 2024-12-17 ENCOUNTER — APPOINTMENT (OUTPATIENT)
Dept: URBAN - METROPOLITAN AREA CLINIC 198 | Age: 49
Setting detail: DERMATOLOGY
End: 2024-12-17

## 2024-12-17 DIAGNOSIS — L663 OTHER SPECIFIED DISEASES OF HAIR AND HAIR FOLLICLES: ICD-10-CM

## 2024-12-17 DIAGNOSIS — L81.4 OTHER MELANIN HYPERPIGMENTATION: ICD-10-CM

## 2024-12-17 DIAGNOSIS — L81.1 CHLOASMA: ICD-10-CM

## 2024-12-17 DIAGNOSIS — D22 MELANOCYTIC NEVI: ICD-10-CM

## 2024-12-17 DIAGNOSIS — L738 OTHER SPECIFIED DISEASES OF HAIR AND HAIR FOLLICLES: ICD-10-CM

## 2024-12-17 DIAGNOSIS — L82.1 OTHER SEBORRHEIC KERATOSIS: ICD-10-CM

## 2024-12-17 DIAGNOSIS — D18.0 HEMANGIOMA: ICD-10-CM

## 2024-12-17 DIAGNOSIS — L21.8 OTHER SEBORRHEIC DERMATITIS: ICD-10-CM

## 2024-12-17 DIAGNOSIS — L57.8 OTHER SKIN CHANGES DUE TO CHRONIC EXPOSURE TO NONIONIZING RADIATION: ICD-10-CM

## 2024-12-17 PROBLEM — D22.71 MELANOCYTIC NEVI OF RIGHT LOWER LIMB, INCLUDING HIP: Status: ACTIVE | Noted: 2024-12-17

## 2024-12-17 PROBLEM — L02.12 FURUNCLE OF NECK: Status: ACTIVE | Noted: 2024-12-17

## 2024-12-17 PROBLEM — D22.61 MELANOCYTIC NEVI OF RIGHT UPPER LIMB, INCLUDING SHOULDER: Status: ACTIVE | Noted: 2024-12-17

## 2024-12-17 PROBLEM — D18.01 HEMANGIOMA OF SKIN AND SUBCUTANEOUS TISSUE: Status: ACTIVE | Noted: 2024-12-17

## 2024-12-17 PROBLEM — D23.72 OTHER BENIGN NEOPLASM OF SKIN OF LEFT LOWER LIMB, INCLUDING HIP: Status: ACTIVE | Noted: 2024-12-17

## 2024-12-17 PROBLEM — D22.5 MELANOCYTIC NEVI OF TRUNK: Status: ACTIVE | Noted: 2024-12-17

## 2024-12-17 PROBLEM — D23.71 OTHER BENIGN NEOPLASM OF SKIN OF RIGHT LOWER LIMB, INCLUDING HIP: Status: ACTIVE | Noted: 2024-12-17

## 2024-12-17 PROBLEM — D22.62 MELANOCYTIC NEVI OF LEFT UPPER LIMB, INCLUDING SHOULDER: Status: ACTIVE | Noted: 2024-12-17

## 2024-12-17 PROBLEM — L02.32 FURUNCLE OF BUTTOCK: Status: ACTIVE | Noted: 2024-12-17

## 2024-12-17 PROBLEM — D22.72 MELANOCYTIC NEVI OF LEFT LOWER LIMB, INCLUDING HIP: Status: ACTIVE | Noted: 2024-12-17

## 2024-12-17 PROCEDURE — 99214 OFFICE O/P EST MOD 30 MIN: CPT

## 2024-12-17 PROCEDURE — OTHER COUNSELING: OTHER

## 2024-12-17 PROCEDURE — OTHER TREATMENT REGIMEN: OTHER

## 2024-12-17 PROCEDURE — OTHER PRESCRIPTION MEDICATION MANAGEMENT: OTHER

## 2024-12-17 PROCEDURE — OTHER SUNSCREEN RECOMMENDATIONS: OTHER

## 2024-12-17 ASSESSMENT — LOCATION DETAILED DESCRIPTION DERM
LOCATION DETAILED: INFERIOR THORACIC SPINE
LOCATION DETAILED: UPPER STERNUM
LOCATION DETAILED: LEFT ANTERIOR DISTAL THIGH
LOCATION DETAILED: LEFT DISTAL DORSAL FOREARM
LOCATION DETAILED: RIGHT PROXIMAL DORSAL FOREARM
LOCATION DETAILED: LEFT ANTERIOR PROXIMAL THIGH
LOCATION DETAILED: LEFT CENTRAL MALAR CHEEK
LOCATION DETAILED: EPIGASTRIC SKIN
LOCATION DETAILED: RIGHT MID-UPPER BACK
LOCATION DETAILED: RIGHT INFRAMAMMARY CREASE (INNER QUADRANT)
LOCATION DETAILED: LEFT BUTTOCK
LOCATION DETAILED: RIGHT BUTTOCK
LOCATION DETAILED: RIGHT SUPERIOR POSTERIOR NECK
LOCATION DETAILED: RIGHT CENTRAL MALAR CHEEK
LOCATION DETAILED: RIGHT DISTAL DORSAL FOREARM
LOCATION DETAILED: PERIUMBILICAL SKIN
LOCATION DETAILED: SUPERIOR MID FOREHEAD
LOCATION DETAILED: SUPERIOR THORACIC SPINE
LOCATION DETAILED: RIGHT ANTERIOR DISTAL THIGH
LOCATION DETAILED: LEFT PROXIMAL DORSAL FOREARM

## 2024-12-17 ASSESSMENT — LOCATION ZONE DERM
LOCATION ZONE: TRUNK
LOCATION ZONE: FACE
LOCATION ZONE: ARM
LOCATION ZONE: LEG
LOCATION ZONE: NECK

## 2024-12-17 ASSESSMENT — LOCATION SIMPLE DESCRIPTION DERM
LOCATION SIMPLE: LEFT FOREARM
LOCATION SIMPLE: RIGHT BUTTOCK
LOCATION SIMPLE: SUPERIOR FOREHEAD
LOCATION SIMPLE: UPPER BACK
LOCATION SIMPLE: POSTERIOR NECK
LOCATION SIMPLE: LEFT BUTTOCK
LOCATION SIMPLE: LEFT CHEEK
LOCATION SIMPLE: RIGHT BREAST
LOCATION SIMPLE: RIGHT UPPER BACK
LOCATION SIMPLE: RIGHT CHEEK
LOCATION SIMPLE: RIGHT THIGH
LOCATION SIMPLE: CHEST
LOCATION SIMPLE: LEFT THIGH
LOCATION SIMPLE: ABDOMEN
LOCATION SIMPLE: RIGHT FOREARM

## 2024-12-17 NOTE — PROCEDURE: PRESCRIPTION MEDICATION MANAGEMENT
Detail Level: Zone
Continue Regimen: Ketoconazole cream PRN flares
Render In Strict Bullet Format?: No
Initiate Treatment: Re start Triluma QHS

## 2025-01-14 ENCOUNTER — TELEMEDICINE (OUTPATIENT)
Dept: NEUROLOGY | Facility: CLINIC | Age: 50
End: 2025-01-14
Payer: COMMERCIAL

## 2025-01-14 DIAGNOSIS — G43.009 MIGRAINE WITHOUT AURA AND WITHOUT STATUS MIGRAINOSUS, NOT INTRACTABLE: Primary | ICD-10-CM

## 2025-01-14 DIAGNOSIS — M54.2 NECK PAIN: ICD-10-CM

## 2025-01-14 PROCEDURE — 99214 OFFICE O/P EST MOD 30 MIN: CPT | Mod: 95 | Performed by: STUDENT IN AN ORGANIZED HEALTH CARE EDUCATION/TRAINING PROGRAM

## 2025-01-14 RX ORDER — ELETRIPTAN HYDROBROMIDE 40 MG/1
40 TABLET, FILM COATED ORAL ONCE AS NEEDED
Qty: 12 TABLET | Refills: 1 | Status: SHIPPED | OUTPATIENT
Start: 2025-01-14 | End: 2025-02-13

## 2025-01-14 RX ORDER — FLUOXETINE 10 MG/1
10 CAPSULE ORAL DAILY
Qty: 7 CAPSULE | Refills: 0 | Status: SHIPPED | OUTPATIENT
Start: 2025-01-14 | End: 2025-02-27

## 2025-01-14 RX ORDER — DULOXETIN HYDROCHLORIDE 60 MG/1
60 CAPSULE, DELAYED RELEASE ORAL DAILY
Qty: 90 CAPSULE | Refills: 3 | Status: SHIPPED | OUTPATIENT
Start: 2025-02-13 | End: 2025-02-27

## 2025-01-14 RX ORDER — FLUOXETINE HYDROCHLORIDE 20 MG/1
20 CAPSULE ORAL DAILY
COMMUNITY
End: 2025-01-14

## 2025-01-14 RX ORDER — DULOXETIN HYDROCHLORIDE 20 MG/1
CAPSULE, DELAYED RELEASE ORAL
Qty: 111 CAPSULE | Refills: 0 | Status: SHIPPED | OUTPATIENT
Start: 2025-01-21 | End: 2025-02-27

## 2025-01-14 NOTE — PROGRESS NOTES
Verification of Patient Location:  The patient affirms they are currently located in the following state: [place]     Request for Consent:    Audio and Video Encounter   Carol, my name is Mallory Arizmendi MD.  Before we proceed, can you please verify your identification by telling me your full name and date of birth?  Can you tell me who is in the room with you?     You and I are about to have a telemedicine check-in or visit because you have requested it.  This is a live video-conference.  I am a real person, speaking to you in real time.  There is no one else with me on the video-conference. I am not recording this conversation and I am asking you not to record it.  This telemedicine visit will be billed to your health insurance or you, if you are self-insured.  You understand you will be responsible for any copayments or coinsurances that apply to your telemedicine visit.  Communication platform used for this encounter:  [employer] Video Visit (Epic Video Client)        Before starting our telemedicine visit, I am required to get your consent for this virtual check-in or visit by telemedicine. Do you consent?        Patient Response to Request for Consent:  Yes        Visit Documentation:    Patient:   Kayy Raphael   YOB: 1975  Date of Visit:   January 14, 2025      Chief Complaint:  Migraines     History of Present Illness:     Previously followed with Dr. Berman    Baseline headaches: daily  Headache frequency now: 1-2 per week  Tried previously prescribed eletriptan 40mg has been helpful  Zavzpret spray - terrible taste and burning  Since October on prozac for mood  HA vitamins did not seem to help headache  Ongoing neck pain - not tried PT yet    Neuropsych testing - 8/16/23 post concussion syndrome  Eye testing: saccadic dysfunction    Occupation: tutoring, disaster relief  Contraception: becoming irregular     Preventive Headache Medications  Current: qulipta  Prior:  nortriptyline - weight gain, Emgality - worked well for 1-2 years then less effective, topamax - sedated, dizzy, nausea, and abdominal pain      Zonisamide - told she had partial seizures was only reporting migraine and muscle spasms, Dr. Berman did a longer study that was normal and stopped ZNS    Abortive Headache Medications Tried  Current: eletriptan  Prior: ubrelvy - nausea, excedrin - may help, Nurtec - not as effective as eletriptan, zavzpret spray - terrible taste and burning    Not tried aleve or motrin    Allergies:  Cat dander, Hydrocodone-acetaminophen, Methylprednisolone, Opioids - morphine analogues, and Zonisamide     All Medications:  Current Outpatient Medications   Medication Sig Dispense Refill    albuterol HFA (PROAIR HFA) 90 mcg/actuation inhaler inhale 2 puff by inhalation route  every 4 - 6 hours as needed      ascorbic acid (VITAMIN C) 250 mg tablet Take 250 mg by mouth daily.      atogepant (QULIPTA) 60 mg tablet Take 60 mg by mouth daily. 30 tablet 1    Bacillus subtilis-inulin 1.5 billion cell-1 gram tablet,chewable Take by mouth.      cholecalciferol, vitamin D3, 1,000 unit (25 mcg) tablet Take 1,000 Units by mouth daily.      famotidine (PEPCID) 40 mg tablet Take 40 mg by mouth daily.      gabapentin (NEURONTIN) 300 mg capsule Take 3 capsules (900 mg total) by mouth nightly. 270 capsule 3    hydroxychloroquine (PlaqueniL) 200 mg tablet Take 200 mg by mouth 2 (two) times a day.      inFLIXimab-axxq (AVSOLA) 100 mg recon soln injection 3 mg/kg of body weightas directed intravenously monthly x 3 then every 8 weeks      leflunomide (ARAVA) 20 mg tablet Take 20 mg by mouth daily.      pramipexole (MIRAPEX) 0.125 mg tablet Take 0.125 mg by mouth nightly.      rimegepant (NURTEC ODT) 75 mg tablet,disintegrating Take 1 tablet (75 mg total) by mouth as needed (Migraine). 16 tablet 1    zavegepant (ZAVZPRET) 10 mg/actuation nasal spray Instill 1 spray in 1 nostril as a single dose.  Maximum: one  spray per 24 hours. 1 each 1     No current facility-administered medications for this visit.        Review of Systems: Complete review of systems was significant only for those items mentioned above. Otherwise, a review of systems was negative.    Physical Examination  Virtual visit    Prior Testing:  MRI brain 10/8/19 mild cerebral volume loss    Labwork:         Assessment and Plan  Kayy Raphael is a 49 y.o. with a history of anxiety/depression, migraines, restless leg syndrome, rheumatoid arthritis presenting for follow-up.  Overall, she is doing well except this summer when studying for her class.  Once she gets a headache, however, it lasts 4-5 hours, and she has to lie down.  We discussed add headache vitamins for prevention.  We will try Zavzpret spray as an abortive to see if we can get faster relief.    Headache diary to identify triggers/patterns: [email or web address].com  At on the onset of headache, take eletriptan.  May take 2nd dose 2 hours later if needed.  Max 2 doses in 24 hours.   Preventive medication (daily headache medication) Qulipta 60mg at night  Cross titrate from Prozac (fluoxetine) to Cymbalta (Duloxetine)  Week 1: Prozac 10mg daily for 1 week then stop the medication  Week 2: Stop prozac  Week 3: Start Cymbalta 20mg daily for 1 week  Week 4: Cymbalta 40mg daily for 1 week  Week 5: Cymbalta 60mg daily.  I will send in a prescription for 60mg tablets so you will take 1 daily.  Stay on this dose.  Physical therapy for neck pain  Follow-up visit in 6 months      Discussed medication side effects and risks/benefits: yes   Discussed potential teratogenic/neurodevelopmental risk with medications: yes    For billing purposes, I spent 30 total minutes including pre-visit, face-to-face, and post-visit documentation on the visit date.  I discussed the assessment and plan with the patient/family, and they were given the opportunity to ask questions.  They expressed understanding and agreement with  the plan.      Mallory Arizmendi MD

## 2025-01-15 ENCOUNTER — DOCUMENTATION (OUTPATIENT)
Dept: NEUROLOGY | Facility: CLINIC | Age: 50
End: 2025-01-15
Payer: COMMERCIAL

## 2025-02-04 RX ORDER — ATOGEPANT 60 MG/1
1 TABLET ORAL DAILY
Qty: 90 TABLET | Refills: 3 | Status: SHIPPED | OUTPATIENT
Start: 2025-02-04

## 2025-02-04 NOTE — TELEPHONE ENCOUNTER
Neuro Medicine Refill Request     Last office visit date: 1/14/25 telemed   Next office visit date: none   When instructed to follow up: 6 months   Last fill date with # of refills provided:   12/6/24 1 refill   PDMP check, if applicable:

## 2025-02-27 DIAGNOSIS — G43.009 MIGRAINE WITHOUT AURA AND WITHOUT STATUS MIGRAINOSUS, NOT INTRACTABLE: ICD-10-CM

## 2025-02-27 RX ORDER — DULOXETIN HYDROCHLORIDE 20 MG/1
40 CAPSULE, DELAYED RELEASE ORAL DAILY
Qty: 180 CAPSULE | Refills: 3 | Status: SHIPPED | OUTPATIENT
Start: 2025-02-27 | End: 2026-02-27

## 2025-02-27 RX ORDER — DULOXETIN HYDROCHLORIDE 20 MG/1
CAPSULE, DELAYED RELEASE ORAL
Qty: 111 CAPSULE | Refills: 0 | Status: CANCELLED | OUTPATIENT
Start: 2025-02-27 | End: 2025-04-12

## 2025-03-04 ENCOUNTER — TELEMEDICINE (OUTPATIENT)
Dept: NEUROLOGY | Facility: CLINIC | Age: 50
End: 2025-03-04
Payer: COMMERCIAL

## 2025-03-04 DIAGNOSIS — G25.81 RESTLESS LEG SYNDROME: Primary | ICD-10-CM

## 2025-03-04 DIAGNOSIS — G43.009 MIGRAINE WITHOUT AURA AND WITHOUT STATUS MIGRAINOSUS, NOT INTRACTABLE: ICD-10-CM

## 2025-03-04 PROCEDURE — 99215 OFFICE O/P EST HI 40 MIN: CPT | Mod: 95 | Performed by: STUDENT IN AN ORGANIZED HEALTH CARE EDUCATION/TRAINING PROGRAM

## 2025-03-04 RX ORDER — GABAPENTIN ENACARBIL 600 MG/1
600 TABLET, EXTENDED RELEASE ORAL NIGHTLY
Qty: 90 TABLET | Refills: 3 | Status: SHIPPED | OUTPATIENT
Start: 2025-03-04

## 2025-03-04 RX ORDER — PRAMIPEXOLE DIHYDROCHLORIDE 0.12 MG/1
0.12 TABLET ORAL NIGHTLY
Qty: 90 TABLET | Refills: 3 | Status: SHIPPED | OUTPATIENT
Start: 2025-03-04

## 2025-03-04 NOTE — PROGRESS NOTES
Verification of Patient Location:  The patient affirms they are currently located in the following state: [place]     Request for Consent:    Audio and Video Encounter   Carol, my name is Jo Ann DO Hannah.  Before we proceed, can you please verify your identification by telling me your full name and date of birth?  Can you tell me who is in the room with you?     You and I are about to have a telemedicine check-in or visit because you have requested it.  This is a live video-conference.  I am a real person, speaking to you in real time.  There is no one else with me on the video-conference. I am not recording this conversation and I am asking you not to record it.  This telemedicine visit will be billed to your health insurance or you, if you are self-insured.  You understand you will be responsible for any copayments or coinsurances that apply to your telemedicine visit.  Communication platform used for this encounter:  [employer] Video Visit (Epic Video Client)        Before starting our telemedicine visit, I am required to get your consent for this virtual check-in or visit by telemedicine. Do you consent?        Patient Response to Request for Consent:  Yes        Visit Documentation:    Patient:   Kayy Raphael   YOB: 1975  Date of Visit:   March 4, 2025      Chief Complaint:  Migraines     History of Present Illness:     Migraines:   Patient follows with Dr. Mallory Arizmendi MD for RLS and migraines, last seen 1/14/2025.  She was stopped Prozac and started on duloxetine for migraines, continued on Qulipta 60mg at night, and eletriptan and rare (<1x/month) Excedrin use for rescue.   Duloxetine titration was up to 60mg daily which helped migraine but worsened RLS per patinet message on 2/14/2025.  Pt was advised to lower duloxetine to 40mg daily as she tolerated this dose.      She has not had any headaches since starting duloxetine.  No use of eletriptan.  Rare use of Excedrin  for non-migraine headaches but only 1 x month.   Also uses duloxetine for depression, which is doing well and controlled.  She is hesitant to decreased duloxetine for risk of worsening headaches or sub optimal depression control.     HA frequency at 1/14/2025 appointment was 1-2 x week.  Now: no headaches in 1-2 months.      RLS:   She switched from Horizant 600mg at 5-6PM to gabapentin in Fall 2024 due to insurance not covering Horizant.  She is on gabapentin 300mg at 7pm and 600mg at 9-9:30.   Pramipexole at 0.125 mg at night, taking 2 tabs/0.250mg  for >50% of the nights over past 2 months which had not helped control symptoms.   She does not have control on current gabapenin 900mg IR nightly.  She has RLS symptoms more than 4-5 night per week when prior to start duloxetine if was only a few (2-3) days per month. She needs to walk, shower, and has delayed or interrupted sleep due to the symptoms.     No other new medications.   No recent ferritin level.  June 2015 ferritin was 17. In 2020 level was at 40.   Does not take iron supplements. Has IBS and worsened constipation.     Recent high fatigue w less exercise which may also contribute to worsening RLS.       Neuropsych testing - 8/16/23 post concussion syndrome  Eye testing: saccadic dysfunction    Occupation: tutoring, disaster relief  Contraception: becoming irregular, has IUD, no heavy menses.      Preventive Headache Medications  Current: qulipta, duloxetine  Prior: nortriptyline - weight gain, Emgality - worked well for 1-2 years then less effective, topamax - sedated, dizzy, nausea, and abdominal pain    Zonisamide - told she had partial seizures was only reporting migraine and muscle spasms, Dr. Berman did a longer study that was normal and stopped ZNS    Abortive Headache Medications Tried  Current: eletriptan, excedrin  Prior: ubrelvy - nausea, excedrin - may help, Nurtec - not as effective as eletriptan, zavzpret spray - terrible taste and  burning    Not tried aleve or motrin    Allergies:  Cat dander, Hydrocodone-acetaminophen, Methylprednisolone, Opioids - morphine analogues, and Zonisamide     All Medications:  Current Outpatient Medications   Medication Sig Dispense Refill    albuterol HFA (PROAIR HFA) 90 mcg/actuation inhaler inhale 2 puff by inhalation route  every 4 - 6 hours as needed      ascorbic acid (VITAMIN C) 250 mg tablet Take 250 mg by mouth daily.      atogepant (QULIPTA) 60 mg tablet TAKE 1 TABLET BY MOUTH DAILY 90 tablet 3    Bacillus subtilis-inulin 1.5 billion cell-1 gram tablet,chewable Take by mouth.      cholecalciferol, vitamin D3, 1,000 unit (25 mcg) tablet Take 1,000 Units by mouth daily.      DULoxetine (CYMBALTA) 20 mg capsule Take 2 capsules (40 mg total) by mouth daily. 180 capsule 3    eletriptan (RELPAX) 40 mg tablet Take 1 tablet (40 mg total) by mouth once as needed for migraine. May repeat in 2 hours if unresolved. Do not exceed 80 mg in 24 hours. 12 tablet 1    famotidine (PEPCID) 40 mg tablet Take 40 mg by mouth daily.      gabapentin (NEURONTIN) 300 mg capsule Take 3 capsules (900 mg total) by mouth nightly. 270 capsule 3    hydroxychloroquine (PlaqueniL) 200 mg tablet Take 200 mg by mouth 2 (two) times a day.      inFLIXimab-axxq (AVSOLA) 100 mg recon soln injection 3 mg/kg of body weightas directed intravenously monthly x 3 then every 8 weeks      leflunomide (ARAVA) 20 mg tablet Take 20 mg by mouth daily.      pramipexole (MIRAPEX) 0.125 mg tablet Take 0.125 mg by mouth nightly.       No current facility-administered medications for this visit.      Starting golimumab infusion next week and stopping infliximab for worsening RA symptoms.     Review of Systems: Complete review of systems was significant only for those items mentioned above. Otherwise, a review of systems was negative.    Physical Examination  Virtual visit    Prior Testing:  MRI brain 10/8/19 mild cerebral volume loss    Labwork:  June 2015  ferritin was 17. In 2020 level was at 40.   Patient reported ferritin levels from PCP, not reviewed personally today.       Assessment and Plan  Kayy Raphael is a 49 y.o. with a history of anxiety/depression, migraines, restless leg syndrome, rheumatoid arthritis presenting for follow-up.     Migraine headaches- controlled.   Headache diary to identify triggers/patterns: [email or web address].com  At on the onset of headache, take eletriptan.  May take 2nd dose 2 hours later if needed.  Max 2 doses in 24 hours.   Limit use of OTC rescue medication (NSAIDs, tylenol, Excedrin) to less than 15 days per month to limit risk of rebound headaches  Preventive medication (daily headache medication) Qulipta 60mg at night  Continue Cymbalta (Duloxetine) 40mg daily    RLS- not at goal. Worsened since starting duloxetine, which is helping depression and markedly helping migraines and thus will no pursue discontinuing duloxetine at this time. Side effects from gabapentin of excessive fatigue and sub-optimal RLS control.  Has tried increased doses of pramipexole which have not been effective in controlling RLS symptoms either. She has best control on Horizant and will pursue switching gabapentin to Horizant for improved symptom control.    Continue pramipexole 0.125mg at night, refill sent to pharmacy  Switch from gabapentin 900mg nightly to Horizant 600mg nightly as follows:   Weeks 1 and 2: Start gabapentin ER (Horizant) 600 mg nightly 9:30 pm. Continue the 300mg IR at 7pm and 600mg IR at 9pm.  Weeks 3 and 4: reduce 900mg IR to 600mg IR qHS and stop 300mg IR at 7pm.  Continue Horizant 600mg nightly  Week 5 and forward: Stop 600mg gabapentin IR completely and continue Horizant 600mg ER   Ordered ferritin level, iron panel, and BMP as there is no Cr. Clearance on file.     Follow-up visit in 2-3 months      Discussed medication side effects and risks/benefits: yes   Discussed potential teratogenic/neurodevelopmental risk with  medications: yes    For billing purposes, I spent 60 total minutes including pre-visit, face-to-face, and post-visit documentation on the visit date.  I discussed the assessment and plan with the patient/family, and they were given the opportunity to ask questions.  They expressed understanding and agreement with the plan.      Jo Ann Young, DO

## 2025-03-07 ENCOUNTER — DOCUMENTATION (OUTPATIENT)
Dept: NEUROLOGY | Facility: CLINIC | Age: 50
End: 2025-03-07
Payer: COMMERCIAL

## 2025-03-19 LAB — FERRITIN SERPL-MCNC: 52 NG/ML (ref 15–150)

## 2025-03-20 LAB
BUN SERPL-MCNC: 17 MG/DL (ref 6–24)
BUN/CREAT SERPL: 18 (ref 9–23)
CALCIUM SERPL-MCNC: 9 MG/DL (ref 8.7–10.2)
CHLORIDE SERPL-SCNC: 104 MMOL/L (ref 96–106)
CO2 SERPL-SCNC: 22 MMOL/L (ref 20–29)
CREAT SERPL-MCNC: 0.96 MG/DL (ref 0.57–1)
EGFRCR SERPLBLD CKD-EPI 2021: 73 ML/MIN/1.73
GLUCOSE SERPL-MCNC: 79 MG/DL (ref 70–99)
IRON SERPL-MCNC: 115 UG/DL (ref 27–159)
POTASSIUM SERPL-SCNC: 4.5 MMOL/L (ref 3.5–5.2)
SODIUM SERPL-SCNC: 139 MMOL/L (ref 134–144)

## 2025-03-21 ENCOUNTER — RESULTS FOLLOW-UP (OUTPATIENT)
Dept: NEUROLOGY | Facility: CLINIC | Age: 50
End: 2025-03-21

## 2025-05-19 ENCOUNTER — TELEPHONE (OUTPATIENT)
Dept: SCHEDULING | Facility: CLINIC | Age: 50
End: 2025-05-19
Payer: COMMERCIAL

## 2025-06-09 ENCOUNTER — TELEMEDICINE (OUTPATIENT)
Dept: NEUROLOGY | Facility: CLINIC | Age: 50
End: 2025-06-09
Payer: COMMERCIAL

## 2025-06-09 DIAGNOSIS — G43.009 MIGRAINE WITHOUT AURA AND WITHOUT STATUS MIGRAINOSUS, NOT INTRACTABLE: Primary | ICD-10-CM

## 2025-06-09 DIAGNOSIS — G25.81 RESTLESS LEG SYNDROME: ICD-10-CM

## 2025-06-09 PROCEDURE — 99214 OFFICE O/P EST MOD 30 MIN: CPT | Mod: 95 | Performed by: STUDENT IN AN ORGANIZED HEALTH CARE EDUCATION/TRAINING PROGRAM

## 2025-06-09 RX ORDER — ATOGEPANT 30 MG/1
30 TABLET ORAL DAILY
Qty: 90 TABLET | Refills: 1 | Status: SHIPPED | OUTPATIENT
Start: 2025-06-09

## 2025-06-09 RX ORDER — PRAMIPEXOLE DIHYDROCHLORIDE 0.25 MG/1
0.25 TABLET ORAL NIGHTLY
Qty: 90 TABLET | Refills: 3 | Status: SHIPPED | OUTPATIENT
Start: 2025-06-09 | End: 2026-06-09

## 2025-06-09 RX ORDER — ELETRIPTAN HYDROBROMIDE 40 MG/1
40 TABLET, FILM COATED ORAL ONCE AS NEEDED
Qty: 12 TABLET | Refills: 2 | Status: SHIPPED | OUTPATIENT
Start: 2025-06-09

## 2025-06-09 NOTE — PROGRESS NOTES
Verification of Patient Location:  The patient affirms they are currently located in the following state: [place]     Request for Consent:    Audio and Video Encounter   Carol, my name is Mallory Arizmendi MD.  Before we proceed, can you please verify your identification by telling me your full name and date of birth?  Can you tell me who is in the room with you?     You and I are about to have a telemedicine check-in or visit because you have requested it.  This is a live video-conference.  I am a real person, speaking to you in real time.  There is no one else with me on the video-conference. I am not recording this conversation and I am asking you not to record it.  This telemedicine visit will be billed to your health insurance or you, if you are self-insured.  You understand you will be responsible for any copayments or coinsurances that apply to your telemedicine visit.  Communication platform used for this encounter:  [employer] Video Visit (Epic Video Client)        Before starting our telemedicine visit, I am required to get your consent for this virtual check-in or visit by telemedicine. Do you consent?        Patient Response to Request for Consent:  Yes        Visit Documentation:    Patient:   Kayy Raphael   YOB: 1975  Date of Visit:   June 9, 2025      Chief Complaint:  Migraines     History of Present Illness:     Previously followed with Dr. Cullen Hoyt mirapex 0.25mg and moved the dose to two hours prior to bedtime - 5 nights without RLS  Takes horizant at night  Duloxetine was very helpful for mood and migraines  Headache days: 2 days per month, takes eletriptan    Baseline headaches: daily    Neuropsych testing - 8/16/23 post concussion syndrome  Eye testing: saccadic dysfunction    Occupation: tutoring, disaster relief  Contraception: becoming irregular     Preventive Headache Medications  Current: qulipta, cymbalta  Prior: nortriptyline - weight gain,  Emgality - worked well for 1-2 years then less effective, topamax - sedated, dizzy, nausea, and abdominal pain      Zonisamide - told she had partial seizures was only reporting migraine and muscle spasms, Dr. Berman did a longer study that was normal and stopped ZNS    Abortive Headache Medications Tried  Current: eletriptan  Prior: ubrelvy - nausea, excedrin - may help, Nurtec - not as effective as eletriptan, zavzpret spray - terrible taste and burning    Not tried aleve or motrin    Allergies:  Cat dander, Hydrocodone-acetaminophen, Methylprednisolone, Opioids - morphine analogues, and Zonisamide     All Medications:  Current Outpatient Medications   Medication Sig Dispense Refill    albuterol HFA (PROAIR HFA) 90 mcg/actuation inhaler inhale 2 puff by inhalation route  every 4 - 6 hours as needed      ascorbic acid (VITAMIN C) 250 mg tablet Take 250 mg by mouth daily.      atogepant (QULIPTA) 60 mg tablet TAKE 1 TABLET BY MOUTH DAILY 90 tablet 3    Bacillus subtilis-inulin 1.5 billion cell-1 gram tablet,chewable Take by mouth.      cholecalciferol, vitamin D3, 1,000 unit (25 mcg) tablet Take 1,000 Units by mouth daily.      DULoxetine (CYMBALTA) 20 mg capsule Take 2 capsules (40 mg total) by mouth daily. 180 capsule 3    eletriptan (RELPAX) 40 mg tablet Take 1 tablet (40 mg total) by mouth once as needed for migraine. May repeat in 2 hours if unresolved. Do not exceed 80 mg in 24 hours. 12 tablet 1    famotidine (PEPCID) 40 mg tablet Take 40 mg by mouth daily.      gabapentin (NEURONTIN) 300 mg capsule Take 3 capsules (900 mg total) by mouth nightly. 270 capsule 3    gabapentin enacarbil (HORIZANT) 600 mg tablet extended release Take 1 tablet (600 mg total) by mouth nightly. 90 tablet 3    hydroxychloroquine (PlaqueniL) 200 mg tablet Take 200 mg by mouth 2 (two) times a day.      inFLIXimab-axxq (AVSOLA) 100 mg recon soln injection 3 mg/kg of body weightas directed intravenously monthly x 3 then every 8 weeks       leflunomide (ARAVA) 20 mg tablet Take 20 mg by mouth daily.      pramipexole (MIRAPEX) 0.125 mg tablet Take 1 tablet (0.125 mg total) by mouth nightly. 90 tablet 3     No current facility-administered medications for this visit.        Review of Systems: Complete review of systems was significant only for those items mentioned above. Otherwise, a review of systems was negative.    Physical Examination  Virtual visit    Prior Testing:  MRI brain 10/8/19 mild cerebral volume loss    Labwork:         Assessment and Plan  Kayy Raphael is a 49 y.o. with a history of anxiety/depression, migraines, restless leg syndrome, rheumatoid arthritis presenting for follow-up.  Headaches and mood improved with Cymbalta, however, cymbalta worsened RLS symptoms. She is doing well since increasing the dose and moving mirapex to 2 hours prior to bedtime.  She would like to try a lower dose of qulipta since cymbalta has helped migraines the most.    Headache diary to identify triggers/patterns: [email or web address].com  At on the onset of headache, take eletriptan.  May take 2nd dose 2 hours later if needed.  Max 2 doses in 24 hours.   Preventive medication (daily headache medication) reduce Qulipta 30mg at night, continue cymbalta 40mg daily  Continue mirapex 0.25mg at night  Follow-up visit in 3 months    Discussed medication side effects and risks/benefits: yes   Discussed potential teratogenic/neurodevelopmental risk with medications: yes    For billing purposes, I spent 30 total minutes including pre-visit, face-to-face, and post-visit documentation on the visit date.  I discussed the assessment and plan with the patient/family, and they were given the opportunity to ask questions.  They expressed understanding and agreement with the plan.      Mallory Arizmendi MD

## 2025-08-15 ENCOUNTER — DOCUMENTATION (OUTPATIENT)
Dept: NEUROLOGY | Facility: CLINIC | Age: 50
End: 2025-08-15
Payer: COMMERCIAL

## 2025-08-26 ENCOUNTER — TELEPHONE (OUTPATIENT)
Dept: NEUROLOGY | Facility: CLINIC | Age: 50
End: 2025-08-26
Payer: COMMERCIAL

## 2025-08-26 DIAGNOSIS — G25.81 RESTLESS LEG SYNDROME: Primary | ICD-10-CM

## 2025-08-26 RX ORDER — PRAMIPEXOLE DIHYDROCHLORIDE 0.25 MG/1
0.5 TABLET ORAL NIGHTLY
Qty: 180 TABLET | Refills: 3 | Status: SHIPPED | OUTPATIENT
Start: 2025-08-26 | End: 2026-08-26

## 2025-08-29 ENCOUNTER — TELEMEDICINE (OUTPATIENT)
Dept: NEUROLOGY | Facility: CLINIC | Age: 50
End: 2025-08-29
Payer: COMMERCIAL